# Patient Record
Sex: MALE | Race: WHITE | NOT HISPANIC OR LATINO | URBAN - METROPOLITAN AREA
[De-identification: names, ages, dates, MRNs, and addresses within clinical notes are randomized per-mention and may not be internally consistent; named-entity substitution may affect disease eponyms.]

---

## 2021-07-16 ENCOUNTER — OFFICE VISIT (OUTPATIENT)
Dept: URGENT CARE | Facility: CLINIC | Age: 46
End: 2021-07-16
Payer: COMMERCIAL

## 2021-07-16 VITALS
HEIGHT: 78 IN | RESPIRATION RATE: 16 BRPM | BODY MASS INDEX: 35.87 KG/M2 | SYSTOLIC BLOOD PRESSURE: 144 MMHG | DIASTOLIC BLOOD PRESSURE: 86 MMHG | WEIGHT: 310 LBS | OXYGEN SATURATION: 98 % | HEART RATE: 99 BPM | TEMPERATURE: 98 F

## 2021-07-16 DIAGNOSIS — Z11.9 ENCOUNTER FOR SCREENING EXAMINATION FOR INFECTIOUS DISEASE: ICD-10-CM

## 2021-07-16 DIAGNOSIS — B34.9 ACUTE VIRAL SYNDROME: Primary | ICD-10-CM

## 2021-07-16 DIAGNOSIS — Z20.822 COVID-19 VIRUS NOT DETECTED: ICD-10-CM

## 2021-07-16 LAB
CTP QC/QA: YES
SARS-COV-2 RDRP RESP QL NAA+PROBE: NEGATIVE

## 2021-07-16 PROCEDURE — 3008F PR BODY MASS INDEX (BMI) DOCUMENTED: ICD-10-PCS | Mod: CPTII,S$GLB,, | Performed by: NURSE PRACTITIONER

## 2021-07-16 PROCEDURE — 99204 PR OFFICE/OUTPT VISIT, NEW, LEVL IV, 45-59 MIN: ICD-10-PCS | Mod: S$GLB,,, | Performed by: NURSE PRACTITIONER

## 2021-07-16 PROCEDURE — U0002 COVID-19 LAB TEST NON-CDC: HCPCS | Mod: QW,S$GLB,, | Performed by: NURSE PRACTITIONER

## 2021-07-16 PROCEDURE — U0002: ICD-10-PCS | Mod: QW,S$GLB,, | Performed by: NURSE PRACTITIONER

## 2021-07-16 PROCEDURE — 3008F BODY MASS INDEX DOCD: CPT | Mod: CPTII,S$GLB,, | Performed by: NURSE PRACTITIONER

## 2021-07-16 PROCEDURE — 99204 OFFICE O/P NEW MOD 45 MIN: CPT | Mod: S$GLB,,, | Performed by: NURSE PRACTITIONER

## 2021-07-16 RX ORDER — FLUTICASONE PROPIONATE 50 MCG
1 SPRAY, SUSPENSION (ML) NASAL DAILY PRN
Qty: 16 G | Refills: 0 | Status: SHIPPED | OUTPATIENT
Start: 2021-07-16

## 2021-07-16 RX ORDER — BENZONATATE 200 MG/1
200 CAPSULE ORAL 3 TIMES DAILY PRN
Qty: 30 CAPSULE | Refills: 0 | Status: SHIPPED | OUTPATIENT
Start: 2021-07-16 | End: 2021-07-26

## 2022-01-05 ENCOUNTER — PATIENT MESSAGE (OUTPATIENT)
Dept: ADMINISTRATIVE | Facility: OTHER | Age: 47
End: 2022-01-05
Payer: COMMERCIAL

## 2022-01-05 ENCOUNTER — LAB VISIT (OUTPATIENT)
Dept: PRIMARY CARE CLINIC | Facility: CLINIC | Age: 47
End: 2022-01-05
Payer: COMMERCIAL

## 2022-01-05 DIAGNOSIS — Z20.822 CONTACT WITH AND (SUSPECTED) EXPOSURE TO COVID-19: ICD-10-CM

## 2022-01-05 LAB
CTP QC/QA: YES
SARS-COV-2 AG RESP QL IA.RAPID: POSITIVE

## 2022-01-05 PROCEDURE — 87811 SARS-COV-2 COVID19 W/OPTIC: CPT

## 2022-01-12 ENCOUNTER — LAB VISIT (OUTPATIENT)
Dept: PRIMARY CARE CLINIC | Facility: CLINIC | Age: 47
End: 2022-01-12
Payer: COMMERCIAL

## 2022-01-12 DIAGNOSIS — Z20.822 CONTACT WITH AND (SUSPECTED) EXPOSURE TO COVID-19: ICD-10-CM

## 2022-01-12 LAB
CTP QC/QA: YES
SARS-COV-2 AG RESP QL IA.RAPID: NEGATIVE

## 2022-01-12 PROCEDURE — 87811 SARS-COV-2 COVID19 W/OPTIC: CPT

## 2022-05-03 ENCOUNTER — PATIENT MESSAGE (OUTPATIENT)
Dept: RESEARCH | Facility: HOSPITAL | Age: 47
End: 2022-05-03
Payer: COMMERCIAL

## 2023-01-27 ENCOUNTER — PATIENT MESSAGE (OUTPATIENT)
Dept: RESEARCH | Facility: HOSPITAL | Age: 48
End: 2023-01-27
Payer: COMMERCIAL

## 2025-02-19 ENCOUNTER — OFFICE VISIT (OUTPATIENT)
Dept: INTERNAL MEDICINE | Facility: CLINIC | Age: 50
End: 2025-02-19
Payer: COMMERCIAL

## 2025-02-19 ENCOUNTER — LAB VISIT (OUTPATIENT)
Dept: LAB | Facility: HOSPITAL | Age: 50
End: 2025-02-19
Attending: INTERNAL MEDICINE
Payer: COMMERCIAL

## 2025-02-19 VITALS
HEIGHT: 78 IN | WEIGHT: 315 LBS | SYSTOLIC BLOOD PRESSURE: 128 MMHG | TEMPERATURE: 97 F | HEART RATE: 10 BPM | RESPIRATION RATE: 14 BRPM | OXYGEN SATURATION: 96 % | BODY MASS INDEX: 36.45 KG/M2 | DIASTOLIC BLOOD PRESSURE: 70 MMHG

## 2025-02-19 DIAGNOSIS — G47.00 INSOMNIA, UNSPECIFIED TYPE: ICD-10-CM

## 2025-02-19 DIAGNOSIS — M25.511 CHRONIC PAIN OF BOTH SHOULDERS: ICD-10-CM

## 2025-02-19 DIAGNOSIS — Z23 NEED FOR VACCINATION: ICD-10-CM

## 2025-02-19 DIAGNOSIS — M25.512 CHRONIC PAIN OF BOTH SHOULDERS: ICD-10-CM

## 2025-02-19 DIAGNOSIS — Z23 NEED FOR TETANUS BOOSTER: ICD-10-CM

## 2025-02-19 DIAGNOSIS — R68.82 DECREASED LIBIDO: ICD-10-CM

## 2025-02-19 DIAGNOSIS — Z12.12 ENCOUNTER FOR COLORECTAL CANCER SCREENING: ICD-10-CM

## 2025-02-19 DIAGNOSIS — G89.29 CHRONIC PAIN OF BOTH SHOULDERS: ICD-10-CM

## 2025-02-19 DIAGNOSIS — Z12.5 PROSTATE CANCER SCREENING: ICD-10-CM

## 2025-02-19 DIAGNOSIS — R53.83 FATIGUE, UNSPECIFIED TYPE: ICD-10-CM

## 2025-02-19 DIAGNOSIS — Z00.00 ANNUAL PHYSICAL EXAM: ICD-10-CM

## 2025-02-19 DIAGNOSIS — Z12.11 ENCOUNTER FOR COLORECTAL CANCER SCREENING: ICD-10-CM

## 2025-02-19 DIAGNOSIS — Z00.00 ANNUAL PHYSICAL EXAM: Primary | ICD-10-CM

## 2025-02-19 LAB
ALBUMIN SERPL BCP-MCNC: 3.6 G/DL (ref 3.5–5.2)
ALP SERPL-CCNC: 72 U/L (ref 40–150)
ALT SERPL W/O P-5'-P-CCNC: 21 U/L (ref 10–44)
ANION GAP SERPL CALC-SCNC: 14 MMOL/L (ref 8–16)
AST SERPL-CCNC: 40 U/L (ref 10–40)
BASOPHILS # BLD AUTO: 0.04 K/UL (ref 0–0.2)
BASOPHILS NFR BLD: 0.6 % (ref 0–1.9)
BILIRUB SERPL-MCNC: 0.4 MG/DL (ref 0.1–1)
BUN SERPL-MCNC: 13 MG/DL (ref 6–20)
CALCIUM SERPL-MCNC: 9.2 MG/DL (ref 8.7–10.5)
CHLORIDE SERPL-SCNC: 107 MMOL/L (ref 95–110)
CHOLEST SERPL-MCNC: 183 MG/DL (ref 120–199)
CHOLEST/HDLC SERPL: 4.1 {RATIO} (ref 2–5)
CO2 SERPL-SCNC: 19 MMOL/L (ref 23–29)
COMPLEXED PSA SERPL-MCNC: 1.6 NG/ML (ref 0–4)
CREAT SERPL-MCNC: 1 MG/DL (ref 0.5–1.4)
DIFFERENTIAL METHOD BLD: NORMAL
EOSINOPHIL # BLD AUTO: 0.2 K/UL (ref 0–0.5)
EOSINOPHIL NFR BLD: 2.9 % (ref 0–8)
ERYTHROCYTE [DISTWIDTH] IN BLOOD BY AUTOMATED COUNT: 13.1 % (ref 11.5–14.5)
EST. GFR  (NO RACE VARIABLE): >60 ML/MIN/1.73 M^2
ESTIMATED AVG GLUCOSE: 103 MG/DL (ref 68–131)
FSH SERPL-ACNC: 3.5 MIU/ML (ref 0.95–11.95)
GLUCOSE SERPL-MCNC: 90 MG/DL (ref 70–110)
HBA1C MFR BLD: 5.2 % (ref 4–5.6)
HCT VFR BLD AUTO: 44.5 % (ref 40–54)
HDLC SERPL-MCNC: 45 MG/DL (ref 40–75)
HDLC SERPL: 24.6 % (ref 20–50)
HGB BLD-MCNC: 14.8 G/DL (ref 14–18)
IMM GRANULOCYTES # BLD AUTO: 0.03 K/UL (ref 0–0.04)
IMM GRANULOCYTES NFR BLD AUTO: 0.5 % (ref 0–0.5)
LDLC SERPL CALC-MCNC: 120.2 MG/DL (ref 63–159)
LH SERPL-ACNC: 1 MIU/ML (ref 0.6–12.1)
LYMPHOCYTES # BLD AUTO: 1.5 K/UL (ref 1–4.8)
LYMPHOCYTES NFR BLD: 24 % (ref 18–48)
MCH RBC QN AUTO: 30.5 PG (ref 27–31)
MCHC RBC AUTO-ENTMCNC: 33.3 G/DL (ref 32–36)
MCV RBC AUTO: 92 FL (ref 82–98)
MONOCYTES # BLD AUTO: 0.5 K/UL (ref 0.3–1)
MONOCYTES NFR BLD: 7.8 % (ref 4–15)
NEUTROPHILS # BLD AUTO: 4 K/UL (ref 1.8–7.7)
NEUTROPHILS NFR BLD: 64.2 % (ref 38–73)
NONHDLC SERPL-MCNC: 138 MG/DL
NRBC BLD-RTO: 0 /100 WBC
PLATELET # BLD AUTO: 310 K/UL (ref 150–450)
PMV BLD AUTO: 10.4 FL (ref 9.2–12.9)
POTASSIUM SERPL-SCNC: 5 MMOL/L (ref 3.5–5.1)
PROT SERPL-MCNC: 6.6 G/DL (ref 6–8.4)
RBC # BLD AUTO: 4.86 M/UL (ref 4.6–6.2)
SODIUM SERPL-SCNC: 140 MMOL/L (ref 136–145)
TESTOST SERPL-MCNC: 242 NG/DL (ref 304–1227)
TRIGL SERPL-MCNC: 89 MG/DL (ref 30–150)
TSH SERPL DL<=0.005 MIU/L-ACNC: 1.69 UIU/ML (ref 0.4–4)
WBC # BLD AUTO: 6.29 K/UL (ref 3.9–12.7)

## 2025-02-19 PROCEDURE — 84403 ASSAY OF TOTAL TESTOSTERONE: CPT | Performed by: INTERNAL MEDICINE

## 2025-02-19 PROCEDURE — 85025 COMPLETE CBC W/AUTO DIFF WBC: CPT | Performed by: INTERNAL MEDICINE

## 2025-02-19 PROCEDURE — 80061 LIPID PANEL: CPT | Performed by: INTERNAL MEDICINE

## 2025-02-19 PROCEDURE — 84153 ASSAY OF PSA TOTAL: CPT | Performed by: INTERNAL MEDICINE

## 2025-02-19 PROCEDURE — 84443 ASSAY THYROID STIM HORMONE: CPT | Performed by: INTERNAL MEDICINE

## 2025-02-19 PROCEDURE — 83002 ASSAY OF GONADOTROPIN (LH): CPT | Performed by: INTERNAL MEDICINE

## 2025-02-19 PROCEDURE — 83001 ASSAY OF GONADOTROPIN (FSH): CPT | Performed by: INTERNAL MEDICINE

## 2025-02-19 PROCEDURE — 80053 COMPREHEN METABOLIC PANEL: CPT | Performed by: INTERNAL MEDICINE

## 2025-02-19 PROCEDURE — 83036 HEMOGLOBIN GLYCOSYLATED A1C: CPT | Performed by: INTERNAL MEDICINE

## 2025-02-19 RX ORDER — MELOXICAM 15 MG/1
15 TABLET ORAL DAILY PRN
Qty: 30 TABLET | Refills: 11 | Status: SHIPPED | OUTPATIENT
Start: 2025-02-19
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
## 2025-02-20 ENCOUNTER — RESULTS FOLLOW-UP (OUTPATIENT)
Dept: INTERNAL MEDICINE | Facility: CLINIC | Age: 50
End: 2025-02-20
Payer: COMMERCIAL

## 2025-02-20 DIAGNOSIS — R79.89 LOW TESTOSTERONE: Primary | ICD-10-CM

## 2025-02-20 NOTE — TELEPHONE ENCOUNTER
Your electrolytes, kidney/liver function, LH, FSH, prostate antigen are normal.  A1c indicates you are not diabetic.  Testosterone is low.  Let us plan to recheck this in about a week at 8:00 a.m..

## 2025-02-26 ENCOUNTER — CLINICAL SUPPORT (OUTPATIENT)
Dept: ENDOSCOPY | Facility: HOSPITAL | Age: 50
End: 2025-02-26
Attending: INTERNAL MEDICINE
Payer: COMMERCIAL

## 2025-02-26 ENCOUNTER — PATIENT MESSAGE (OUTPATIENT)
Dept: ENDOSCOPY | Facility: HOSPITAL | Age: 50
End: 2025-02-26

## 2025-02-26 VITALS — BODY MASS INDEX: 36.45 KG/M2 | WEIGHT: 315 LBS | HEIGHT: 78 IN

## 2025-02-26 DIAGNOSIS — Z12.11 ENCOUNTER FOR COLORECTAL CANCER SCREENING: ICD-10-CM

## 2025-02-26 DIAGNOSIS — Z12.11 COLON CANCER SCREENING: Primary | ICD-10-CM

## 2025-02-26 DIAGNOSIS — Z12.12 ENCOUNTER FOR COLORECTAL CANCER SCREENING: ICD-10-CM

## 2025-02-26 NOTE — PLAN OF CARE
Referral for procedure from PAT appointment      Spoke to patient to schedule procedure(s) Colonoscopy       Physician to perform procedure(s) Dr. Gordon  Date of Procedure (s) 03/18/2025  Arrival Time 1:40 Pm   Time of Procedure(s) 2:40 PM    Location of Procedure(s) [facility] [address] [facility]  Type of Rx Prep sent to patient: PEG  Instructions provided to patient via [facility]    Patient was informed on the following information and verbalized understanding. Screening questionnaire reviewed with patient and complete. If procedure requires anesthesia, a responsible adult needs to be present to accompany the patient home, patient cannot drive after receiving anesthesia. Appointment details are tentative, especially check-in time. Patient will receive a prep-op call 7 days prior to confirm check-in time for procedure. If applicable the patient should contact their pharmacy to verify Rx for procedure prep is ready for pick-up. Patient was advised to call the scheduling department at [phone] if pharmacy states no Rx is available. Patient was advised to call the endoscopy scheduling department if any questions or concerns arise.      [facility] Scheduling Department

## 2025-02-27 ENCOUNTER — LAB VISIT (OUTPATIENT)
Dept: LAB | Facility: HOSPITAL | Age: 50
End: 2025-02-27
Payer: COMMERCIAL

## 2025-02-27 DIAGNOSIS — R79.89 LOW TESTOSTERONE: ICD-10-CM

## 2025-02-27 LAB — TESTOST SERPL-MCNC: 276 NG/DL (ref 304–1227)

## 2025-02-27 PROCEDURE — 36415 COLL VENOUS BLD VENIPUNCTURE: CPT | Mod: PO | Performed by: INTERNAL MEDICINE

## 2025-02-27 PROCEDURE — 84403 ASSAY OF TOTAL TESTOSTERONE: CPT | Performed by: INTERNAL MEDICINE

## 2025-02-27 RX ORDER — POLYETHYLENE GLYCOL 3350, SODIUM SULFATE ANHYDROUS, SODIUM BICARBONATE, SODIUM CHLORIDE, POTASSIUM CHLORIDE 236; 22.74; 6.74; 5.86; 2.97 G/4L; G/4L; G/4L; G/4L; G/4L
4 POWDER, FOR SOLUTION ORAL ONCE
Qty: 4000 ML | Refills: 0 | Status: SHIPPED | OUTPATIENT
Start: 2025-02-27 | End: 2025-02-27

## 2025-02-28 ENCOUNTER — PATIENT MESSAGE (OUTPATIENT)
Dept: INTERNAL MEDICINE | Facility: CLINIC | Age: 50
End: 2025-02-28
Payer: COMMERCIAL

## 2025-02-28 ENCOUNTER — RESULTS FOLLOW-UP (OUTPATIENT)
Dept: INTERNAL MEDICINE | Facility: CLINIC | Age: 50
End: 2025-02-28
Payer: COMMERCIAL

## 2025-02-28 DIAGNOSIS — R79.89 LOW TESTOSTERONE: Primary | ICD-10-CM

## 2025-02-28 RX ORDER — TESTOSTERONE 20.25 MG/1.25G
20.25 GEL TOPICAL DAILY
Qty: 75 G | Refills: 0 | Status: SHIPPED | OUTPATIENT
Start: 2025-02-28

## 2025-02-28 NOTE — TELEPHONE ENCOUNTER
----- Message from Mario Vázquez MD sent at 2/28/2025  9:43 AM CST -----      ----- Message -----  From: Ayah Varghese MA  Sent: 2/28/2025   9:40 AM CST  To: Mario Vázquez MD    ----- Message from Ayah Varghese MA sent at 2/28/2025  9:40 AM CST -----      ----- Message -----  From: Yung, Pro[facility] Interface  Sent: 2/27/2025   1:27 PM CST  To: Mario Vázquez MD

## 2025-02-28 NOTE — TELEPHONE ENCOUNTER
Testosterone ordered.  Please have patient follow-up with me in a month as early in the day as possible.  We need to try to get labs around 8:00 a.m.

## 2025-03-10 ENCOUNTER — PATIENT MESSAGE (OUTPATIENT)
Dept: ENDOSCOPY | Facility: HOSPITAL | Age: 50
End: 2025-03-10
Payer: COMMERCIAL

## 2025-03-18 ENCOUNTER — ANESTHESIA EVENT (OUTPATIENT)
Dept: ENDOSCOPY | Facility: HOSPITAL | Age: 50
End: 2025-03-18
Payer: COMMERCIAL

## 2025-03-18 ENCOUNTER — ANESTHESIA (OUTPATIENT)
Dept: ENDOSCOPY | Facility: HOSPITAL | Age: 50
End: 2025-03-18
Payer: COMMERCIAL

## 2025-03-18 ENCOUNTER — HOSPITAL ENCOUNTER (OUTPATIENT)
Facility: HOSPITAL | Age: 50
Discharge: HOME | End: 2025-03-18
Attending: INTERNAL MEDICINE | Admitting: INTERNAL MEDICINE
Payer: COMMERCIAL

## 2025-03-18 VITALS
SYSTOLIC BLOOD PRESSURE: 97 MMHG | HEIGHT: 78 IN | TEMPERATURE: 98 F | HEART RATE: 75 BPM | OXYGEN SATURATION: 95 % | WEIGHT: 315 LBS | BODY MASS INDEX: 36.45 KG/M2 | DIASTOLIC BLOOD PRESSURE: 55 MMHG | RESPIRATION RATE: 18 BRPM

## 2025-03-18 DIAGNOSIS — Z12.12 ENCOUNTER FOR COLORECTAL CANCER SCREENING: Primary | ICD-10-CM

## 2025-03-18 DIAGNOSIS — Z12.11 SCREENING FOR COLON CANCER: ICD-10-CM

## 2025-03-18 DIAGNOSIS — Z12.11 ENCOUNTER FOR COLORECTAL CANCER SCREENING: Primary | ICD-10-CM

## 2025-03-18 PROCEDURE — 63600175 PHARM REV CODE 636 W HCPCS: Performed by: NURSE ANESTHETIST, CERTIFIED REGISTERED

## 2025-03-18 PROCEDURE — G0121 COLON CA SCRN NOT HI RSK IND: HCPCS | Performed by: INTERNAL MEDICINE

## 2025-03-18 PROCEDURE — 37000009 HC ANESTHESIA EA ADD 15 MINS: Performed by: INTERNAL MEDICINE

## 2025-03-18 PROCEDURE — 25000003 PHARM REV CODE 250: Performed by: NURSE ANESTHETIST, CERTIFIED REGISTERED

## 2025-03-18 PROCEDURE — 37000008 HC ANESTHESIA 1ST 15 MINUTES: Performed by: INTERNAL MEDICINE

## 2025-03-18 PROCEDURE — 25000003 PHARM REV CODE 250: Performed by: INTERNAL MEDICINE

## 2025-03-18 PROCEDURE — G0121 COLON CA SCRN NOT HI RSK IND: HCPCS | Mod: ,,, | Performed by: INTERNAL MEDICINE

## 2025-03-18 RX ORDER — SODIUM CHLORIDE 9 MG/ML
INJECTION, SOLUTION INTRAVENOUS CONTINUOUS
Status: DISCONTINUED | OUTPATIENT
Start: 2025-03-18 | End: 2025-03-18 | Stop reason: HOSPADM

## 2025-03-18 RX ORDER — PROPOFOL 10 MG/ML
VIAL (ML) INTRAVENOUS CONTINUOUS PRN
Status: DISCONTINUED | OUTPATIENT
Start: 2025-03-18 | End: 2025-03-18

## 2025-03-18 RX ORDER — LIDOCAINE HYDROCHLORIDE 20 MG/ML
INJECTION INTRAVENOUS
Status: DISCONTINUED | OUTPATIENT
Start: 2025-03-18 | End: 2025-03-18

## 2025-03-18 RX ORDER — DEXMEDETOMIDINE HYDROCHLORIDE 100 UG/ML
INJECTION, SOLUTION INTRAVENOUS
Status: DISCONTINUED | OUTPATIENT
Start: 2025-03-18 | End: 2025-03-18

## 2025-03-18 RX ADMIN — SODIUM CHLORIDE: 0.9 INJECTION, SOLUTION INTRAVENOUS at 02:03

## 2025-03-18 RX ADMIN — DEXMEDETOMIDINE 8 MCG: 100 INJECTION, SOLUTION, CONCENTRATE INTRAVENOUS at 02:03

## 2025-03-18 RX ADMIN — DEXMEDETOMIDINE 4 MCG: 100 INJECTION, SOLUTION, CONCENTRATE INTRAVENOUS at 02:03

## 2025-03-18 RX ADMIN — PROPOFOL 150 MCG/KG/MIN: 10 INJECTION, EMULSION INTRAVENOUS at 02:03

## 2025-03-18 RX ADMIN — LIDOCAINE HYDROCHLORIDE 50 MG: 20 INJECTION INTRAVENOUS at 02:03

## 2025-03-18 RX ADMIN — DEXMEDETOMIDINE 4 MCG: 100 INJECTION, SOLUTION, CONCENTRATE INTRAVENOUS at 03:03

## 2025-03-18 NOTE — TRANSFER OF CARE
"Anesthesia Transfer of Care Note    Patient: Edinson Rodriguez    Procedure(s) Performed: Procedure(s) (LRB):  COLONOSCOPY, SCREENING, LOW RISK PATIENT (N/A)    Patient location: PACU    Anesthesia Type: general    Transport from OR: Transported from OR on 6-10 L/min O2 by face mask with adequate spontaneous ventilation    Post pain: adequate analgesia    Post assessment: no apparent anesthetic complications    Post vital signs: stable    Level of consciousness: awake    Nausea/Vomiting: no nausea/vomiting    Complications: none    Transfer of care protocol was followed    Last vitals: Visit Vitals  /84 (BP Location: Left arm, Patient Position: Lying)   Pulse 82   Temp 36.8 °C (98.2 °F) (Temporal)   Resp 18   Ht 6' 6" (1.981 m)   Wt (!) 142.9 kg (315 lb 0.6 oz)   SpO2 (!) 94%   BMI 36.41 kg/m²     "

## 2025-03-18 NOTE — ANESTHESIA PREPROCEDURE EVALUATION
"                 Pre-operative evaluation for Procedure(s) (LRB):  COLONOSCOPY, SCREENING, LOW RISK PATIENT (N/A)    Edinson Rodriguez is a 49 y.o. male w/ obesity (BMI 37) who presents for screening colonoscopy.       Prev airway: none on record      2D Echo: none on record      EKG: none on record        Problem List[1]    Review of patient's allergies indicates:  No Known Allergies    Past Surgical History:   Procedure Laterality Date    SPINE SURGERY  1989    C4 & C5 fracture surgery when 13 years old         Vital Signs:         CBC: No results for input(s): "WBC", "RBC", "HGB", "HCT", "PLT", "MCV", "MCH", "MCHC" in the last 72 hours.    CMP: No results for input(s): "NA", "K", "CL", "CO2", "BUN", "CREATININE", "GLU", "MG", "PHOS", "CALCIUM", "ALBUMIN", "PROT", "ALKPHOS", "ALT", "AST", "BILITOT" in the last 72 hours.    INR  No results for input(s): "PT", "INR", "PROTIME", "APTT" in the last 72 hours.        Pre-op Assessment    I have reviewed the Patient Summary Reports.     I have reviewed the Nursing Notes. I have reviewed the NPO Status.   I have reviewed the Medications.     Review of Systems  Anesthesia Hx:  No problems with previous Anesthesia             Denies Family Hx of Anesthesia complications.     Hematology/Oncology:    Oncology Normal                                   Cardiovascular:  Cardiovascular Normal      Denies Valvular problems/Murmurs.                                         Pulmonary:  Pulmonary Normal    Denies Asthma.                    Renal/:  Renal/ Normal                 Hepatic/GI:  Hepatic/GI Normal Bowel Prep.                   Neurological:  Neurology Normal      Denies Seizures.                                Endocrine:  Endocrine Normal          Obesity / BMI > 30      Physical Exam  General: Alert and Oriented    Airway:  Mallampati: II   Mouth Opening: Normal  TM Distance: Normal  Tongue: Normal    Dental:  Intact    Chest/Lungs:  Clear to auscultation, Normal Respiratory " Rate    Heart:  Rate: Normal  Rhythm: Regular Rhythm        Anesthesia Plan  Type of Anesthesia, risks & benefits discussed:    Anesthesia Type: Gen Natural Airway  Intra-op Monitoring Plan: Standard ASA Monitors  Post Op Pain Control Plan:   (medical reason for not using multimodal pain management)  Induction:  IV  Informed Consent: Informed consent signed with the Patient and all parties understand the risks and agree with anesthesia plan.  All questions answered.   ASA Score: 2  Day of Surgery Review of History & Physical: H&P Update referred to the surgeon/provider.    Ready For Surgery From Anesthesia Perspective.     .           [1] There is no problem list on file for this patient.

## 2025-03-18 NOTE — PROVATION PATIENT INSTRUCTIONS
Discharge Summary/Instructions after an Endoscopic Procedure  Patient Name: Edinson Rodriguez  Patient MRN: [id number]  Patient YOB: 1975  Tuesday, March 18, 2025  Bola Clayton MD  Dear patient,  As a result of recent federal legislation (The Federal Cures Act), you may   receive lab or pathology results from your procedure in your [facility]   account before your physician is able to contact you. Your physician or   their representative will relay the results to you with their   recommendations at their soonest availability.  Thank you,  RESTRICTIONS:  During your procedure today, you received medications for sedation.  These   medications may affect your judgment, balance and coordination.  Therefore,   for 24 hours, you have the following restrictions:   - DO NOT drive a car, operate machinery, make legal/financial decisions,   sign important papers or drink alcohol.    ACTIVITY:  Today: no heavy lifting, straining or running due to procedural   sedation/anesthesia.  The following day: return to full activity including work.  DIET:  Eat and drink normally unless instructed otherwise.     TREATMENT FOR COMMON SIDE EFFECTS:  - Mild abdominal pain, nausea, belching, bloating or excessive gas:  rest,   eat lightly and use a heating pad.  - Sore Throat: treat with throat lozenges and/or gargle with warm salt   water.  - Because air was used during the procedure, expelling large amounts of air   from your rectum or belching is normal.  - If a bowel prep was taken, you may not have a bowel movement for 1-3 days.    This is normal.  SYMPTOMS TO WATCH FOR AND REPORT TO YOUR PHYSICIAN:  1. Abdominal pain or bloating, other than gas cramps.  2. Chest pain.  3. Back pain.  4. Signs of infection such as: chills or fever occurring within 24 hours   after the procedure.  5. Rectal bleeding, which would show as bright red, maroon, or black stools.   (A tablespoon of blood from the rectum is not serious, especially  if   hemorrhoids are present.)  6. Vomiting.  7. Weakness or dizziness.  GO DIRECTLY TO THE NEAREST EMERGENCY ROOM IF YOU HAVE ANY OF THE FOLLOWING:      Difficulty breathing              Chills and/or fever over 101 F   Persistent vomiting and/or vomiting blood   Severe abdominal pain   Severe chest pain   Black, tarry stools   Bleeding- more than one tablespoon   Any other symptom or condition that you feel may need urgent attention  Your doctor recommends these additional instructions:  If any biopsies were taken, your doctors clinic will contact you in 1 to 2   weeks with any results.  - Discharge patient to home (ambulatory).   - Patient has a contact number available for emergencies.  The signs and   symptoms of potential delayed complications were discussed with the   patient.  Return to normal activities tomorrow.  Written discharge   instructions were provided to the patient.   - Resume previous diet.   - Continue present medications.   - Return to primary care physician as previously scheduled.   - Repeat colonoscopy in 10 years for screening purposes.  For questions, problems or results please call your physician - Bola Clayton MD at Work:  [phone].  [facility], EMERGENCY ROOM PHONE NUMBER: [phone]  IF A COMPLICATION OR EMERGENCY SITUATION ARISES AND YOU ARE UNABLE TO REACH   YOUR PHYSICIAN - GO DIRECTLY TO THE EMERGENCY ROOM.  MD Bola Dutton MD  3/18/2025 3:13:28 PM  This report has been verified and signed electronically.  Dear patient,  As a result of recent federal legislation (The Federal Cures Act), you may   receive lab or pathology results from your procedure in your [facility]   account before your physician is able to contact you. Your physician or   their representative will relay the results to you with their   recommendations at their soonest availability.  Thank you,  PROVATION

## 2025-03-18 NOTE — H&P
Short Stay Endoscopy History and Physical    PCP - Mario Vázquez MD  Referring Physician - Mario Vázquez MD  [address] [facility]  [place] [id number]    Procedure - Colonoscopy  ASA - per anesthesia  Mallampati - per anesthesia  History of Anesthesia problems - no  Family history Anesthesia problems -  no   Plan of anesthesia - General    HPI  49 y.o. male  Reason for procedure:   Encounter for colorectal cancer screening [Z12.11, Z12.12]        ROS:  Constitutional: No fevers, chills, No weight loss  CV: No chest pain  Pulm: No cough, No shortness of breath  GI: see HPI    Medical History:  has no past medical history on file.    Surgical History:  has a past surgical history that includes Spine surgery (1989).    Family History: family history includes Alcohol abuse in his father; Cancer in his mother..    Social History:  reports that he has never smoked. He does not have any smokeless tobacco history on file. He reports current alcohol use of about 2.0 standard drinks of alcohol per week. He reports that he does not use drugs.    Review of patient's allergies indicates:  No Known Allergies    Medications:   Prescriptions Prior to Admission[1]    Physical Exam:    Vital Signs:   Vitals:    03/18/25 1406   BP: 122/84   Pulse: 82   Resp: 18   Temp: 98.2 °F (36.8 °C)       General Appearance: Well appearing in no acute distress  Abdomen: Soft, non tender, non distended with normal bowel sounds, no masses    Labs:  Lab Results   Component Value Date    WBC 6.29 02/19/2025    HGB 14.8 02/19/2025    HCT 44.5 02/19/2025     02/19/2025    CHOL 183 02/19/2025    TRIG 89 02/19/2025    HDL 45 02/19/2025    ALT 21 02/19/2025    AST 40 02/19/2025     02/19/2025    K 5.0 02/19/2025     02/19/2025    CREATININE 1.0 02/19/2025    BUN 13 02/19/2025    CO2 19 (L) 02/19/2025    TSH 1.689 02/19/2025    PSA 1.6 02/19/2025    HGBA1C 5.2 02/19/2025       I have explained the risks and benefits of this  endoscopic procedure to the patient including but not limited to bleeding, inflammation, infection, perforation, and death.      Bola Clayton MD       [1]   Medications Prior to Admission   Medication Sig Dispense Refill Last Dose/Taking    meloxicam (MOBIC) 15 MG tablet Take 1 tablet (15 mg total) by mouth daily as needed for Pain. 30 tablet 11 Past Week    testosterone (ANDROGEL) 20.25 mg/1.25 gram (1.62 %) GlPm Place 20.25 mg onto the skin once daily. 75 g 0 3/16/2025

## 2025-03-19 NOTE — ANESTHESIA POSTPROCEDURE EVALUATION
Anesthesia Post Evaluation    Patient: Edinson Rodriguez    Procedure(s) Performed: Procedure(s) (LRB):  COLONOSCOPY, SCREENING, LOW RISK PATIENT (N/A)    Final Anesthesia Type: general      Patient location during evaluation: GI PACU  Patient participation: Yes- Able to Participate  Level of consciousness: awake  Post-procedure vital signs: reviewed and stable  Pain management: adequate  Airway patency: patent    PONV status at discharge: No PONV  Anesthetic complications: no      Cardiovascular status: blood pressure returned to baseline  Respiratory status: unassisted, spontaneous ventilation and room air                Vitals Value Taken Time   BP 97/55 03/18/25 15:45   Temp 36.5 °C (97.7 °F) 03/18/25 15:19   Pulse 75 03/18/25 15:45   Resp 18 03/18/25 15:45   SpO2 95 % 03/18/25 15:45         Event Time   Out of Recovery 16:01:23         Pain/Hamida Score: Hamida Score: 10 (3/18/2025  3:20 PM)

## 2025-03-21 ENCOUNTER — CLINICAL SUPPORT (OUTPATIENT)
Dept: REHABILITATION | Facility: HOSPITAL | Age: 50
End: 2025-03-21
Attending: INTERNAL MEDICINE
Payer: COMMERCIAL

## 2025-03-21 DIAGNOSIS — M25.511 CHRONIC PAIN OF BOTH SHOULDERS: ICD-10-CM

## 2025-03-21 DIAGNOSIS — G89.29 CHRONIC PAIN OF BOTH SHOULDERS: ICD-10-CM

## 2025-03-21 DIAGNOSIS — M25.512 CHRONIC PAIN OF BOTH SHOULDERS: ICD-10-CM

## 2025-03-21 PROCEDURE — 97110 THERAPEUTIC EXERCISES: CPT | Mod: PO | Performed by: PHYSICAL THERAPIST

## 2025-03-21 PROCEDURE — 97161 PT EVAL LOW COMPLEX 20 MIN: CPT | Mod: PO | Performed by: PHYSICAL THERAPIST

## 2025-03-21 NOTE — PROGRESS NOTES
Outpatient Rehab    Physical Therapy Evaluation    Patient Name: Igor Rodriguez  MRN: [id number]  YOB: 1975  Encounter Date: 3/21/2025    Therapy Diagnosis:   Encounter Diagnosis   Name Primary?    Chronic pain of both shoulders      Physician: Mario Vázquez MD    Physician Orders: Eval and Treat  Medical Diagnosis: Chronic pain of both shoulders    Visit # / Visits Authorized:  1 / 1  Date of Evaluation: 3/21/2025  Insurance Authorization Period: 2/19/2025 to 12/31/2025  Plan of Care Certification:  3/21/2025 to 6/21/2025      PT/PTA:     Number of PTA visits since last PT visit:   Time In: 0705   Time Out: 0750  Total Time: 45   Total Billable Time: 45    Intake Outcome Measure for FOTO Survey    Therapist reviewed FOTO scores for Igor Rodriguez on 3/21/2025.   FOTO report - see Media section or FOTO account episode details.     Intake Score:  %         Subjective   History of Present Illness  Igor is a 49 y.o. male who reports to physical therapy with a chief concern of bilateral shoulder pain.         Diagnostic tests related to this condition: None.        Dominant Hand: Right  History of Present Condition/Illness: End of last year, he started to feel some challenges lifting the arms overhead and when sleeping on the sides (both). Around Mardi gras, he ws doing some yardwork and felt an increase in right side shoulder pain. He had a bad AC dislocation in his 20s that has pain from time to time but has no been consistent. Lifting > 25 med pounds will cause pain in the arms. He does not endorse N&T or dropping of items at this time. He had a C3-5 fusion in his teenage years so he has limited range of motion.    Activities of Daily Living  Social history was obtained from Patient.                   Pain     Patient reports a current pain level of 2/10. Pain at best is reported as 0/10. Pain at worst is reported as 5/10.   Location: R>L shoulders - deep in the shoulder  Clinical Progression (since onset):  Stable  Pain Qualities: Other (Comment)  Other Pain Qualities: constant  Pain-Relieving Factors: Rest, Other (Comment)  Other Pain-Relieving Factors: nothing  Pain-Aggravating Factors: Lifting, Holding objects         Employment  Patient does not report that: Does the patient's condition impact their ability to work?  Employment Status: Employed full-time          Past Medical History/Physical Systems Review:   Edinson Rodriguez  has no past medical history on file.    Edinson Rodriguez  has a past surgical history that includes Spine surgery (1989) and colonoscopy, screening, low risk patient (N/A, 3/18/2025).    Edinson has a current medication list which includes the following prescription(s): meloxicam and testosterone.    Review of patient's allergies indicates:  No Known Allergies     Objective      Shoulder Palpation  Right Shoulder Palpation  Abnormal: Muscle and Tendon/Ligament  Right Shoulder Muscle Palpation Observations: mod upper trap, levator scap and pectoral tightness  Right Shoulder Tendon/Ligament Palpation Observations: tenderness over ACJ       Left Shoulder Palpation  Abnormal: Muscle  Left Shoulder Muscle Palpation Observations: mod upper trap, levator scap and pectoral tightness           Subcranial Range of Motion   Active Restricted? Passive Restricted? Pain   Flexion         Protraction         Retraction           Cervical Range of Motion   Active (deg) Passive (deg) Pain   Flexion 42       Extension 22       Right Lateral Flexion         Right Rotation 40       Left Lateral Flexion         Left Rotation 35              Shoulder Range of Motion  Right Shoulder   Active (deg) Passive (deg) Pain   Flexion 135       Extension         Scaption         ABduction 140       ADduction         Horizontal ABduction         Horizontal ADduction         External Rotation (Shoulder ABducted 0 degrees)         External Rotation (Shoulder ABducted 45 degrees)         External Rotation (Shoulder ABducted 90  degrees)         Internal Rotation (Shoulder ABducted 0 degrees)         Internal Rotation (Shoulder ABducted 45 degrees)         Internal Rotation (Shoulder ABducted 90 degrees)           Left Shoulder   Active (deg) Passive (deg) Pain   Flexion 155       Extension         Scaption         ABduction 155       ADduction         Horizontal ABduction         Horizontal ADduction         External Rotation (Shoulder ABducted 0 degrees)         External Rotation (Shoulder ABducted 45 degrees)         External Rotation (Shoulder ABducted 90 degrees)         Internal Rotation (Shoulder ABducted 0 degrees)         Internal Rotation (Shoulder ABducted 45 degrees)         Internal Rotation (Shoulder ABducted 90 degrees)             Shoulder, Elbow, or Forearm Range of Motion Details: Left functional internal rotation to T10 with pain, external rotation to T4 with no pain, right functional internal rotation to L4 with ant translation, external rotation to T3         Shoulder Strength - Planes of Motion   Right Strength Right Pain Left Strength Left  Pain   Flexion 4+   4+     Extension           ABduction 4+   4+     ADduction           Horizontal ABduction           Horizontal ADduction           Internal Rotation 0°           Internal Rotation 90° 4+   4+     External Rotation 0°           External Rotation 90° 4   4+                   Shoulder Special Tests  Positive: Right Ely's and Left Ely's  Impingement Tests  Positive: Right Quintanilla-Bradly, Left Quintanilla-Bradly, Right Rodney's, and Left Rodney's           Shoulder Joint Mobility  Right Shoulder Mobility  Hypomobile: Posterior Capsule Mobility, Inferior Capsule Mobility, and Acromioclavicular  Left Shoulder Mobility  Hypomobile: Posterior Capsule Mobility, Inferior Capsule Mobility, and Acromioclavicular       Right Scapular Mobility  Hypomobile: Medial  Left Scapular Mobility  Hypomobile: Medial                   Treatment:  Therapeutic Exercise  TE 1: bilateral  ER RTB  TE 2: horizontal abd RTB      Time Entry(in minutes):  PT Evaluation (Low) Time Entry: 25  Therapeutic Exercise Time Entry: 15    Assessment & Plan   Assessment  Igor presents with a condition of Low complexity.   Presentation of Symptoms: Stable  Will Comorbidities Impact Care: No       Functional Limitations: Activity tolerance, Completing self-care activities, Functional mobility, Disrupted sleep pattern, Pain with ADLs/IADLs, Pain when reaching, Performing household chores, Range of motion, Reaching  Impairments: Abnormal muscle firing, Abnormal or restricted range of motion, Impaired physical strength, Lack of appropriate home exercise program, Pain with functional activity    Patient Goal for Therapy (PT): find otu what is wrong and prevent further issues with the shoulder  Prognosis: Good  Assessment Details: Patient presents to therapy with signs and symptoms consistent with R>L side shoulder impingement syndrome caused by over use of the rotator cuff complex with recent yardwork flaring the shoulders up. He has tightness in the inferior and posterior joint capsule putting more stressover the anterior shoulder complex. He responded well to RTC muscle engagement activity and scapular strengthening activity.     Plan  From a physical therapy perspective, the patient would benefit from: Skilled Rehab Services    Planned therapy interventions include: Therapeutic exercise, Therapeutic activities, Neuromuscular re-education, Manual therapy, ADLs/IADLs, and Other (Comment). Dry needling if indicated          Visit Frequency: 1 times Per Week for 6 Weeks.       This plan was discussed with Patient.   Discussion participants: Agreed Upon Plan of Care             Patient's spiritual, cultural, and educational needs considered and patient agreeable to plan of care and goals.           Goals:   Active       LTG       50% FOTO score improvement       Start:  03/21/25    Expected End:  05/16/25            Pt will  be able to perform > 60 min yardwork without pain       Start:  03/21/25    Expected End:  05/16/25            Pt will be able to don/doff shirts and jackets without pain       Start:  03/21/25    Expected End:  05/16/25               STG       10 degree R shoulder ROM improvement       Start:  03/21/25    Expected End:  04/18/25            1/2 grade MMT improvement       Start:  03/21/25    Expected End:  04/18/25            50% reduction in tenderness over the shoulder musculature       Start:  03/21/25    Expected End:  04/18/25            50% improvement in glenohumeral mobility in the shoulders       Start:  03/21/25    Expected End:  04/18/25                Mitzi Kemp PT, DPT

## 2025-03-24 ENCOUNTER — PATIENT MESSAGE (OUTPATIENT)
Dept: INTERNAL MEDICINE | Facility: CLINIC | Age: 50
End: 2025-03-24
Payer: COMMERCIAL

## 2025-03-24 NOTE — PROGRESS NOTES
Outpatient Rehab    Physical Therapy Visit    Patient Name: Igor Rodriguez  MRN: [id number]  YOB: 1975  Encounter Date: 3/25/2025    Therapy Diagnosis:   Encounter Diagnosis   Name Primary?    Chronic pain of both shoulders Yes     Physician: Mario Vázquez MD    Physician Orders: Eval and Treat  Medical Diagnosis: Chronic pain of both shoulders    Visit # / Visits Authorized:  1 / 10  Insurance Authorization Period: 2/26/2025 to 12/31/2025  Date of Evaluation: 3/21/2025  Plan of Care Certification:  3/21/2025 to 6/21/2025      PT/PTA: PTA   Number of PTA visits since last PT visit:1  Time In: 0702   Time Out: 0801  Total Time: 59   Total Billable Time: 59    FOTO:  Intake Score:  %  Survey Score 1:  %  Survey Score 2:  %         Subjective   he's a little sore today from activity over the weekend.  Pain reported as 2/10. bilateral shoulder R>L    Objective            Treatment:  Therapeutic Exercise  TE 1: dowel flexion with 3# dowel 3x10  TE 2: side lying ER, abduction, flexion 2x10 each  Manual Therapy  MT 1: inferior/posterior joint mobilization  Balance/Neuromuscular Re-Education  NMR 1: bilateral ER RTB 3x10  NMR 2: horizontal abduction RTB 3x10  NMR 3: supine serratus punches with 3# dowel 3x10  NMR 4: rows GTB 3x10  NMR 5: shoulder extension GTB 3x10    Time Entry(in minutes):  Manual Therapy Time Entry: 15  Neuromuscular Re-Education Time Entry: 24  Therapeutic Exercise Time Entry: 20    Assessment & Plan   Assessment: Igor presents to treatment with bilateral shoulder pain. He had a favorable response to manual therapy interventions prior to the start of session. Initiated scapular stability and rotator cuff strengthening to improve function and decrease pain. Will continue to progress as tolerated.  Evaluation/Treatment Tolerance: Patient tolerated treatment well    Patient will continue to benefit from skilled outpatient physical therapy to address the deficits listed in the problem list box on  initial evaluation, provide pt/family education and to maximize pt's level of independence in the home and community environment.     Patient's spiritual, cultural, and educational needs considered and patient agreeable to plan of care and goals.           Plan: continue to progress per PT POC    Goals:   Active       LTG       50% FOTO score improvement (Progressing)       Start:  03/21/25    Expected End:  05/16/25            Pt will be able to perform > 60 min yardwork without pain (Progressing)       Start:  03/21/25    Expected End:  05/16/25            Pt will be able to don/doff shirts and jackets without pain (Progressing)       Start:  03/21/25    Expected End:  05/16/25               STG       10 degree R shoulder ROM improvement (Progressing)       Start:  03/21/25    Expected End:  04/18/25            1/2 grade MMT improvement (Progressing)       Start:  03/21/25    Expected End:  04/18/25            50% reduction in tenderness over the shoulder musculature (Progressing)       Start:  03/21/25    Expected End:  04/18/25            50% improvement in glenohumeral mobility in the shoulders (Progressing)       Start:  03/21/25    Expected End:  04/18/25                Janis Watters PTA

## 2025-03-25 ENCOUNTER — CLINICAL SUPPORT (OUTPATIENT)
Dept: REHABILITATION | Facility: HOSPITAL | Age: 50
End: 2025-03-25
Payer: COMMERCIAL

## 2025-03-25 DIAGNOSIS — M25.511 CHRONIC PAIN OF BOTH SHOULDERS: Primary | ICD-10-CM

## 2025-03-25 DIAGNOSIS — G89.29 CHRONIC PAIN OF BOTH SHOULDERS: Primary | ICD-10-CM

## 2025-03-25 DIAGNOSIS — M25.512 CHRONIC PAIN OF BOTH SHOULDERS: Primary | ICD-10-CM

## 2025-03-25 PROCEDURE — 97110 THERAPEUTIC EXERCISES: CPT | Mod: PO,CQ

## 2025-03-25 PROCEDURE — 97140 MANUAL THERAPY 1/> REGIONS: CPT | Mod: PO,CQ

## 2025-03-25 PROCEDURE — 97112 NEUROMUSCULAR REEDUCATION: CPT | Mod: PO,CQ

## 2025-04-01 ENCOUNTER — CLINICAL SUPPORT (OUTPATIENT)
Dept: REHABILITATION | Facility: HOSPITAL | Age: 50
End: 2025-04-01
Payer: COMMERCIAL

## 2025-04-01 DIAGNOSIS — G89.29 CHRONIC PAIN OF BOTH SHOULDERS: Primary | ICD-10-CM

## 2025-04-01 DIAGNOSIS — M25.512 CHRONIC PAIN OF BOTH SHOULDERS: Primary | ICD-10-CM

## 2025-04-01 DIAGNOSIS — M25.511 CHRONIC PAIN OF BOTH SHOULDERS: Primary | ICD-10-CM

## 2025-04-01 PROCEDURE — 97110 THERAPEUTIC EXERCISES: CPT | Mod: PO | Performed by: PHYSICAL THERAPIST

## 2025-04-01 PROCEDURE — 97112 NEUROMUSCULAR REEDUCATION: CPT | Mod: PO | Performed by: PHYSICAL THERAPIST

## 2025-04-01 PROCEDURE — 97140 MANUAL THERAPY 1/> REGIONS: CPT | Mod: PO | Performed by: PHYSICAL THERAPIST

## 2025-04-01 NOTE — PROGRESS NOTES
Outpatient Rehab    Physical Therapy Visit    Patient Name: Igor Rodriguez  MRN: [id number]  YOB: 1975  Encounter Date: 4/1/2025    Therapy Diagnosis:   Encounter Diagnosis   Name Primary?    Chronic pain of both shoulders Yes     Physician: Mario Vázquez MD    Physician Orders: Eval and Treat  Medical Diagnosis: Chronic pain of both shoulders    Visit # / Visits Authorized:  2 / 10  Insurance Authorization Period: 2/26/2025 to 12/31/2025  Date of Evaluation: 3/21/2025  Plan of Care Certification: 3/21/2025 to 6/21/2025     PT/PTA:     Number of PTA visits since last PT visit:   Time In: 0700   Time Out: 0745  Total Time: 45   Total Billable Time: 45    FOTO:  Intake Score:  %  Survey Score 1:  %  Survey Score 2:  %         Subjective             Objective            Treatment:  Therapeutic Exercise  TE 1: dowel flexion with 3# dowel 3x10  TE 2: side lying ER, abduction, flexion 2x10 each -  Manual Therapy  MT 1: post GH glide in neutral and ER  MT 2: MWM into inferior glide in scaption with R should  MT 3: STM to deltoid, post RTC muscles and upper traps  Balance/Neuromuscular Re-Education  NMR 1: bilateral ER RTB 3x10  NMR 2: horizontal abduction RTB 3x10  NMR 3: supine serratus punches with 3# dowel 3x10  NMR 4: rows GTB 3x10  NMR 5: shoulder extension GTB 3x10    Time Entry(in minutes):  Manual Therapy Time Entry: 15  Neuromuscular Re-Education Time Entry: 20  Therapeutic Exercise Time Entry: 10    Assessment & Plan   Assessment: Pt responded well to session today focused on scap engagement and post RTC engagement after joint mobilizations. Progressing well overall, update HEP next visit  Evaluation/Treatment Tolerance: Patient tolerated treatment well    Patient will continue to benefit from skilled outpatient physical therapy to address the deficits listed in the problem list box on initial evaluation, provide pt/family education and to maximize pt's level of independence in the home and community  environment.     Patient's spiritual, cultural, and educational needs considered and patient agreeable to plan of care and goals.           Plan: continue to progress per PT POC    Goals:   Active       LTG       50% FOTO score improvement (Progressing)       Start:  03/21/25    Expected End:  05/16/25            Pt will be able to perform > 60 min yardwork without pain (Progressing)       Start:  03/21/25    Expected End:  05/16/25            Pt will be able to don/doff shirts and jackets without pain (Progressing)       Start:  03/21/25    Expected End:  05/16/25               STG       10 degree R shoulder ROM improvement (Progressing)       Start:  03/21/25    Expected End:  04/18/25            1/2 grade MMT improvement (Progressing)       Start:  03/21/25    Expected End:  04/18/25            50% reduction in tenderness over the shoulder musculature (Progressing)       Start:  03/21/25    Expected End:  04/18/25            50% improvement in glenohumeral mobility in the shoulders (Progressing)       Start:  03/21/25    Expected End:  04/18/25                Mitzi Kemp, PT, DPT

## 2025-04-07 NOTE — PROGRESS NOTES
Outpatient Rehab    Physical Therapy Visit    Patient Name: Igor Rodriguez  MRN: [id number]  YOB: 1975  Encounter Date: 4/8/2025    Therapy Diagnosis:   Encounter Diagnosis   Name Primary?    Chronic pain of both shoulders Yes     Physician: Mario Vázquez MD    Physician Orders: Eval and Treat  Medical Diagnosis: Chronic pain of both shoulders    Visit # / Visits Authorized:  3 / 10  Insurance Authorization Period: 2/26/2025 to 12/31/2025  Date of Evaluation: 3/21/2025  Plan of Care Certification: 3/21/2025 to 6/21/2025     PT/PTA: PTA   Number of PTA visits since last PT visit:1  Time In: 0700   Time Out: 0755  Total Time: 55   Total Billable Time: 55    FOTO:  Intake Score:  %  Survey Score 1:  %  Survey Score 2:  %         Subjective   his left arm feels better but there hasn't been any change in the right.  Pain reported as 2/10. bilateral shoulder R>L    Objective            Treatment:  Therapeutic Exercise  TE 1: dowel flexion with 4# dowel 3x10  TE 2: side lying ER, abduction, flexion 2x10 each - 1#  TE 3: UBE 3'fwd/3'bck  Manual Therapy  MT 1: post GH glide in neutral and ER  MT 2: MWM into inferior glide in scaption with R should  MT 3: STM to deltoid, post RTC muscles and upper traps  Balance/Neuromuscular Re-Education  NMR 1: bilateral ER RTB 3x10  NMR 2: horizontal abduction RTB 3x10  NMR 3: supine serratus punches with 4# dowel 3x10  NMR 4: rows GTB 3x10  NMR 5: shoulder extension GTB 3x10  NMR 6: +serratus wall slides YTB 2x10    Time Entry(in minutes):  Manual Therapy Time Entry: 10  Neuromuscular Re-Education Time Entry: 25  Therapeutic Exercise Time Entry: 20    Assessment & Plan   Assessment:         Patient will continue to benefit from skilled outpatient physical therapy to address the deficits listed in the problem list box on initial evaluation, provide pt/family education and to maximize pt's level of independence in the home and community environment.     Patient's spiritual, cultural,  and educational needs considered and patient agreeable to plan of care and goals.           Plan: continue to progress per PT POC    Goals:   Active       LTG       50% FOTO score improvement (Progressing)       Start:  03/21/25    Expected End:  05/16/25            Pt will be able to perform > 60 min yardwork without pain (Progressing)       Start:  03/21/25    Expected End:  05/16/25            Pt will be able to don/doff shirts and jackets without pain (Progressing)       Start:  03/21/25    Expected End:  05/16/25               STG       10 degree R shoulder ROM improvement (Progressing)       Start:  03/21/25    Expected End:  04/18/25            1/2 grade MMT improvement (Progressing)       Start:  03/21/25    Expected End:  04/18/25            50% reduction in tenderness over the shoulder musculature (Progressing)       Start:  03/21/25    Expected End:  04/18/25            50% improvement in glenohumeral mobility in the shoulders (Progressing)       Start:  03/21/25    Expected End:  04/18/25                Janis Watters PTA

## 2025-04-08 ENCOUNTER — CLINICAL SUPPORT (OUTPATIENT)
Dept: REHABILITATION | Facility: HOSPITAL | Age: 50
End: 2025-04-08
Payer: COMMERCIAL

## 2025-04-08 DIAGNOSIS — G89.29 CHRONIC PAIN OF BOTH SHOULDERS: Primary | ICD-10-CM

## 2025-04-08 DIAGNOSIS — M25.512 CHRONIC PAIN OF BOTH SHOULDERS: Primary | ICD-10-CM

## 2025-04-08 DIAGNOSIS — M25.511 CHRONIC PAIN OF BOTH SHOULDERS: Primary | ICD-10-CM

## 2025-04-08 PROCEDURE — 97112 NEUROMUSCULAR REEDUCATION: CPT | Mod: PO,CQ

## 2025-04-08 PROCEDURE — 97110 THERAPEUTIC EXERCISES: CPT | Mod: PO,CQ

## 2025-04-08 PROCEDURE — 97140 MANUAL THERAPY 1/> REGIONS: CPT | Mod: PO,CQ

## 2025-04-14 NOTE — PROGRESS NOTES
Outpatient Rehab    Physical Therapy Visit    Patient Name: Igor Rodriguez  MRN: [id number]  YOB: 1975  Encounter Date: 4/15/2025    Therapy Diagnosis:   Encounter Diagnosis   Name Primary?    Chronic pain of both shoulders Yes     Physician: Mario Vázquez MD    Physician Orders: Eval and Treat  Medical Diagnosis: Chronic pain of both shoulders    Visit # / Visits Authorized:  4 / 10  Insurance Authorization Period: 2/26/2025 to 12/31/2025  Date of Evaluation: 3/21/2025  Plan of Care Certification: 3/21/2025 to 6/21/2025     PT/PTA: PTA   Number of PTA visits since last PT visit:2  Time In: 0710   Time Out: 0755  Total Time: 45   Total Billable Time: 45    FOTO:  Intake Score:  %  Survey Score 1:  %  Survey Score 2:  %         Subjective   he's been doing his exercises twice a day and it feels like it's getting better.  Pain reported as 0/10. bilateral shoulder R>L, no pain just soreness    Objective            Treatment:  Therapeutic Exercise  TE 1: dowel flexion with 4# dowel 3x10  TE 2: side lying ER, abduction, flexion 2x10 each - +2#  TE 3: UBE 3'fwd/3'bck  Manual Therapy  MT 1: post GH glide in neutral and ER  MT 2: MWM into inferior glide in scaption with R should  MT 3: STM to deltoid, post RTC muscles and upper traps  Balance/Neuromuscular Re-Education  NMR 1: bilateral ER RTB 3x10  NMR 2: horizontal abduction RTB 3x10  NMR 3: supine serratus punches with 4# dowel 3x10  NMR 4: rows GTB 3x10  NMR 5: shoulder extension GTB 3x10  NMR 6: serratus wall slides YTB 2x10  NMR 7: +scap pushups at wall x20  NMR 8: +shoulder taps at wall x20    Time Entry(in minutes):  Manual Therapy Time Entry: 8  Neuromuscular Re-Education Time Entry: 23  Therapeutic Exercise Time Entry: 14    Assessment & Plan   Assessment: Igor is progressing well and denied having pain today. He reports having soreness only and notices progress with ROM. He tolerated increased resistance on side lying exercises today as well as additionl  scapular stability exercises at wall today. Will continue to progress as tolerated.  Evaluation/Treatment Tolerance: Patient tolerated treatment well    Patient will continue to benefit from skilled outpatient physical therapy to address the deficits listed in the problem list box on initial evaluation, provide pt/family education and to maximize pt's level of independence in the home and community environment.     Patient's spiritual, cultural, and educational needs considered and patient agreeable to plan of care and goals.           Plan: continue to progress per PT POC    Goals:   Active       LTG       50% FOTO score improvement (Progressing)       Start:  03/21/25    Expected End:  05/16/25            Pt will be able to perform > 60 min yardwork without pain (Progressing)       Start:  03/21/25    Expected End:  05/16/25            Pt will be able to don/doff shirts and jackets without pain (Progressing)       Start:  03/21/25    Expected End:  05/16/25               STG       10 degree R shoulder ROM improvement (Progressing)       Start:  03/21/25    Expected End:  04/18/25            1/2 grade MMT improvement (Progressing)       Start:  03/21/25    Expected End:  04/18/25            50% reduction in tenderness over the shoulder musculature (Progressing)       Start:  03/21/25    Expected End:  04/18/25            50% improvement in glenohumeral mobility in the shoulders (Progressing)       Start:  03/21/25    Expected End:  04/18/25                Janis Watters PTA

## 2025-04-15 ENCOUNTER — CLINICAL SUPPORT (OUTPATIENT)
Dept: REHABILITATION | Facility: HOSPITAL | Age: 50
End: 2025-04-15
Payer: COMMERCIAL

## 2025-04-15 DIAGNOSIS — G89.29 CHRONIC PAIN OF BOTH SHOULDERS: Primary | ICD-10-CM

## 2025-04-15 DIAGNOSIS — M25.512 CHRONIC PAIN OF BOTH SHOULDERS: Primary | ICD-10-CM

## 2025-04-15 DIAGNOSIS — M25.511 CHRONIC PAIN OF BOTH SHOULDERS: Primary | ICD-10-CM

## 2025-04-15 PROCEDURE — 97110 THERAPEUTIC EXERCISES: CPT | Mod: PO,CQ

## 2025-04-15 PROCEDURE — 97112 NEUROMUSCULAR REEDUCATION: CPT | Mod: PO,CQ

## 2025-04-15 PROCEDURE — 97140 MANUAL THERAPY 1/> REGIONS: CPT | Mod: PO,CQ

## 2025-04-22 ENCOUNTER — CLINICAL SUPPORT (OUTPATIENT)
Dept: REHABILITATION | Facility: HOSPITAL | Age: 50
End: 2025-04-22
Payer: COMMERCIAL

## 2025-04-22 DIAGNOSIS — G89.29 CHRONIC PAIN OF BOTH SHOULDERS: Primary | ICD-10-CM

## 2025-04-22 DIAGNOSIS — M25.511 CHRONIC PAIN OF BOTH SHOULDERS: Primary | ICD-10-CM

## 2025-04-22 DIAGNOSIS — M25.512 CHRONIC PAIN OF BOTH SHOULDERS: Primary | ICD-10-CM

## 2025-04-22 PROCEDURE — 97112 NEUROMUSCULAR REEDUCATION: CPT | Mod: PO | Performed by: PHYSICAL THERAPIST

## 2025-04-22 PROCEDURE — 97110 THERAPEUTIC EXERCISES: CPT | Mod: PO | Performed by: PHYSICAL THERAPIST

## 2025-04-22 NOTE — PROGRESS NOTES
Outpatient Rehab    Physical Therapy Progress Note    Patient Name: Igor Rodriguez  MRN: [id number]  YOB: 1975  Encounter Date: 4/22/2025    Therapy Diagnosis:   Encounter Diagnosis   Name Primary?    Chronic pain of both shoulders Yes     Physician: Mario Vázquez MD    Physician Orders: Eval and Treat  Medical Diagnosis: Chronic pain of both shoulders    Visit # / Visits Authorized:  5 / 10  Insurance Authorization Period: 2/26/2025 to 12/31/2025  Date of Evaluation: 2/26/2025  Plan of Care Certification: 2/26/2025 to 5/26/2025     PT/PTA:     Number of PTA visits since last PT visit:   Time In: 0705   Time Out: 0750  Total Time: 45   Total Billable Time: 45    FOTO:  Intake Score:  %  Survey Score 1:  %  Survey Score 2:  %         Subjective   doing well overall, he used the arm a litle too much this weekend so is feeling more soreness.  Pain reported as 1/10.      Objective      Shoulder Range of Motion  Right Shoulder   Active (deg) Passive (deg) Pain   Flexion 162       Extension         Scaption         ABduction 140       ADduction         Horizontal ABduction         Horizontal ADduction         External Rotation (Shoulder ABducted 0 degrees)         External Rotation (Shoulder ABducted 45 degrees)         External Rotation (Shoulder ABducted 90 degrees)         Internal Rotation (Shoulder ABducted 0 degrees)         Internal Rotation (Shoulder ABducted 45 degrees)         Internal Rotation (Shoulder ABducted 90 degrees)           Left Shoulder   Active (deg) Passive (deg) Pain   Flexion 160       Extension         Scaption         ABduction 160       ADduction         Horizontal ABduction         Horizontal ADduction         External Rotation (Shoulder ABducted 0 degrees)         External Rotation (Shoulder ABducted 45 degrees)         External Rotation (Shoulder ABducted 90 degrees)         Internal Rotation (Shoulder ABducted 0 degrees)         Internal Rotation (Shoulder ABducted 45 degrees)          Internal Rotation (Shoulder ABducted 90 degrees)             Shoulder, Elbow, or Forearm Range of Motion Details: Right internal rotation to L1, external rotation to T4           Treatment:  Therapeutic Exercise  TE 1: dowel flexion with 4# dowel 3x10  TE 2: side lying ER, abduction, flexion 2x10 each - +2#  TE 3: UBE 3'fwd/3'bck  TE 4: functional IR 2 X 60 seconds pulleys  Manual Therapy  MT 1: post GH glide in neutral and ER  MT 2: MWM into inferior glide in scaption with R should  MT 3: STM to deltoid, post RTC muscles and upper traps  Balance/Neuromuscular Re-Education  NMR 1: bilateral ER RTB 3x10  NMR 2: horizontal abduction RTB 3x10  NMR 3: supine serratus punches with 4# dowel 3x10  NMR 4: rows GTB 3x10  NMR 5: shoulder extension GTB 3x10  NMR 6: serratus wall slides YTB 2x10  NMR 7: +scap pushups at wall x20  NMR 8: +shoulder taps at wall x20    Time Entry(in minutes):  Manual Therapy Time Entry: 10  Neuromuscular Re-Education Time Entry: 25  Therapeutic Exercise Time Entry: 10    Assessment & Plan   Assessment: Igor is showing good overall progress and has improved ROM and strength in the shoulder, he continues to have challenges with functional IR activity but is progressing well into shoulder ROM       Patient will continue to benefit from skilled outpatient physical therapy to address the deficits listed in the problem list box on initial evaluation, provide pt/family education and to maximize pt's level of independence in the home and community environment.     Patient's spiritual, cultural, and educational needs considered and patient agreeable to plan of care and goals.           Plan: continue to progress per PT POC    Goals:   Active       LTG       50% FOTO score improvement (Progressing)       Start:  03/21/25    Expected End:  05/16/25            Pt will be able to perform > 60 min yardwork without pain (Progressing)       Start:  03/21/25    Expected End:  05/16/25            Pt will be able  to don/doff shirts and jackets without pain (Progressing)       Start:  03/21/25    Expected End:  05/16/25               STG       10 degree R shoulder ROM improvement (Progressing)       Start:  03/21/25    Expected End:  04/18/25            1/2 grade MMT improvement (Progressing)       Start:  03/21/25    Expected End:  04/18/25            50% reduction in tenderness over the shoulder musculature (Progressing)       Start:  03/21/25    Expected End:  04/18/25            50% improvement in glenohumeral mobility in the shoulders (Progressing)       Start:  03/21/25    Expected End:  04/18/25                Mitzi Kemp PT, DPT

## 2025-04-29 ENCOUNTER — CLINICAL SUPPORT (OUTPATIENT)
Dept: REHABILITATION | Facility: HOSPITAL | Age: 50
End: 2025-04-29
Payer: COMMERCIAL

## 2025-04-29 DIAGNOSIS — M25.512 CHRONIC PAIN OF BOTH SHOULDERS: Primary | ICD-10-CM

## 2025-04-29 DIAGNOSIS — M25.511 CHRONIC PAIN OF BOTH SHOULDERS: Primary | ICD-10-CM

## 2025-04-29 DIAGNOSIS — G89.29 CHRONIC PAIN OF BOTH SHOULDERS: Primary | ICD-10-CM

## 2025-04-29 PROCEDURE — 97112 NEUROMUSCULAR REEDUCATION: CPT | Mod: PO | Performed by: PHYSICAL THERAPIST

## 2025-04-29 PROCEDURE — 97110 THERAPEUTIC EXERCISES: CPT | Mod: PO | Performed by: PHYSICAL THERAPIST

## 2025-04-29 NOTE — PROGRESS NOTES
Outpatient Rehab    Physical Therapy Visit    Patient Name: Igor Rodriguez  MRN: [id number]  YOB: 1975  Encounter Date: 4/29/2025    Therapy Diagnosis:   Encounter Diagnosis   Name Primary?    Chronic pain of both shoulders Yes     Physician: Mario Vázquez MD    Physician Orders: Eval and Treat  Medical Diagnosis: Chronic pain of both shoulders    Visit # / Visits Authorized:  6 / 10  Insurance Authorization Period: 2/26/2025 to 12/31/2025  Date of Evaluation: 2/26/2025  Plan of Care Certification: 2/26/2025 to 5/26/2025     PT/PTA:     Number of PTA visits since last PT visit:   Time In: 0705   Time Out: 0750  Total Time: 45   Total Billable Time: 45    FOTO:  Intake Score:  %  Survey Score 1:  %  Survey Score 2:  %         Subjective   not sure what he did but the shoulder was hurting a little more this weekend, lying on the shoulder is not as comfortable.  Pain reported as 3/10.      Objective            Treatment:  Therapeutic Exercise  TE 1: dowel flexion with 4# dowel 3x10  TE 2: side lying ER, abduction, flexion 2x10 each - +2#  TE 3: UBE 3'fwd/3'bck  TE 4: functional IR 2 X 60 seconds pulleys  TE 5: pec stretching R 2 X 30 seconds  Manual Therapy  MT 1: post GH glide in neutral and ER  MT 2: MWM into inferior glide in scaption with R should  MT 3: STM to deltoid, post RTC muscles and upper traps  MT 4: MFR to pectoral muscles  Balance/Neuromuscular Re-Education  NMR 1: bilateral ER RTB 3x10  NMR 2: horizontal abduction RTB 3x10  NMR 3: supine serratus punches with 4# dowel 3x10  NMR 4: rows GTB 3x10  NMR 5: shoulder extension GTB 3x10  NMR 6: serratus wall slides YTB 2x10  NMR 7: +scap pushups at wall x20  NMR 8: +shoulder taps at wall x20    Time Entry(in minutes):  Manual Therapy Time Entry: 10  Neuromuscular Re-Education Time Entry: 25  Therapeutic Exercise Time Entry: 10    Assessment & Plan   Assessment: Igor did well today after pec MFR and stretching. He was able to get more comfortable into IR  position without anterior shoulder translation. Progressing well with strengthening, but is presenting with incresaed tightness over the pectoral complex  Evaluation/Treatment Tolerance: Patient tolerated treatment well    Patient will continue to benefit from skilled outpatient physical therapy to address the deficits listed in the problem list box on initial evaluation, provide pt/family education and to maximize pt's level of independence in the home and community environment.     Patient's spiritual, cultural, and educational needs considered and patient agreeable to plan of care and goals.           Plan: continue to progress per PT POC    Goals:   Active       LTG       50% FOTO score improvement (Progressing)       Start:  03/21/25    Expected End:  05/16/25            Pt will be able to perform > 60 min yardwork without pain (Progressing)       Start:  03/21/25    Expected End:  05/16/25            Pt will be able to don/doff shirts and jackets without pain (Progressing)       Start:  03/21/25    Expected End:  05/16/25               STG       10 degree R shoulder ROM improvement (Progressing)       Start:  03/21/25    Expected End:  04/18/25            1/2 grade MMT improvement (Progressing)       Start:  03/21/25    Expected End:  04/18/25            50% reduction in tenderness over the shoulder musculature (Progressing)       Start:  03/21/25    Expected End:  04/18/25            50% improvement in glenohumeral mobility in the shoulders (Progressing)       Start:  03/21/25    Expected End:  04/18/25                Mitzi Kemp, PT, DPT

## 2025-05-07 ENCOUNTER — CLINICAL SUPPORT (OUTPATIENT)
Dept: REHABILITATION | Facility: HOSPITAL | Age: 50
End: 2025-05-07
Payer: COMMERCIAL

## 2025-05-07 DIAGNOSIS — M25.512 CHRONIC PAIN OF BOTH SHOULDERS: Primary | ICD-10-CM

## 2025-05-07 DIAGNOSIS — M25.511 CHRONIC PAIN OF BOTH SHOULDERS: Primary | ICD-10-CM

## 2025-05-07 DIAGNOSIS — G89.29 CHRONIC PAIN OF BOTH SHOULDERS: Primary | ICD-10-CM

## 2025-05-07 PROCEDURE — 97112 NEUROMUSCULAR REEDUCATION: CPT | Mod: PO

## 2025-05-07 PROCEDURE — 97140 MANUAL THERAPY 1/> REGIONS: CPT | Mod: PO

## 2025-05-07 NOTE — PROGRESS NOTES
Outpatient Rehab    Physical Therapy Visit    Patient Name: Igor Rodriguez  MRN: [id number]  YOB: 1975  Encounter Date: 5/7/2025    Therapy Diagnosis:   Encounter Diagnosis   Name Primary?    Chronic pain of both shoulders Yes     Physician: Mario Vázquez MD    Physician Orders: Eval and Treat  Medical Diagnosis: Chronic pain of both shoulders    Visit # / Visits Authorized:  7 / 10  Insurance Authorization Period: 2/26/2025 to 12/31/2025  Date of Evaluation: 2/26/2025   Plan of Care Certification: 2/26/2025 to 5/26/2025       PT/PTA: PT   Number of PTA visits since last PT visit:0  Time In: 0703   Time Out: 0800  Total Time (in minutes): 57   Total Billable Time (in minutes): 38    FOTO:  Intake Score:  %  Survey Score 2:  %  Survey Score 3:  %         Subjective   Pt reported that his shoulders are feeling good this morning and have been for the last few days..  Pain reported as 1/10.      Objective            Treatment:  Therapeutic Exercise  TE 1: dowel flexion with 4# dowel 3x10  TE 2: side lying ER, abduction, flexion 2x10 each - +2#  TE 3: UBE 3'fwd/3'bck  TE 4: functional IR 2 X 60 seconds pulleys  TE 5: pec stretching R 2 X 30 seconds  Manual Therapy  MT 1: post GH glide in neutral and ER  MT 4: MFR to pectoral muscles  Balance/Neuromuscular Re-Education  NMR 1: bilateral ER RTB 3x10  NMR 2: horizontal abduction RTB 3x10  NMR 3: supine serratus punches with 4# dowel 3x10  NMR 4: rows GTB 3x10  NMR 5: shoulder extension GTB 3x10  NMR 6: serratus wall slides YTB 2x10 - with foam roller  NMR 7: +scap pushups at wall x20  NMR 8: +shoulder taps at wall x20      Time Entry(in minutes):  Therapeutic Exercise Time Entry: 20    Assessment & Plan   Assessment: Malinda reported that his shoulders have been feeling better the last few days. He reported some discomfort with serratus wall slides with a foam roller, but had little difficulty with the other exercises.  Evaluation/Treatment Tolerance: Patient tolerated  treatment well    Patient will continue to benefit from skilled outpatient physical therapy to address the deficits listed in the problem list box on initial evaluation, provide pt/family education and to maximize pt's level of independence in the home and community environment.     Patient's spiritual, cultural, and educational needs considered and patient agreeable to plan of care and goals.           Plan: continue to progress per PT POC    Goals:   Active       LTG       50% FOTO score improvement (Progressing)       Start:  03/21/25    Expected End:  05/16/25            Pt will be able to perform > 60 min yardwork without pain (Progressing)       Start:  03/21/25    Expected End:  05/16/25            Pt will be able to don/doff shirts and jackets without pain (Progressing)       Start:  03/21/25    Expected End:  05/16/25               STG       10 degree R shoulder ROM improvement (Progressing)       Start:  03/21/25    Expected End:  04/18/25            1/2 grade MMT improvement (Progressing)       Start:  03/21/25    Expected End:  04/18/25            50% reduction in tenderness over the shoulder musculature (Progressing)       Start:  03/21/25    Expected End:  04/18/25            50% improvement in glenohumeral mobility in the shoulders (Progressing)       Start:  03/21/25    Expected End:  04/18/25                Leroy Rizzo, PT

## 2025-05-21 NOTE — PROGRESS NOTES
Outpatient Rehab    Physical Therapy Visit    Patient Name: Igor Rodriguez  MRN: [id number]  YOB: 1975  Encounter Date: 5/22/2025    Therapy Diagnosis:   Encounter Diagnosis   Name Primary?    Chronic pain of both shoulders Yes     Physician: Mario Vázquez MD    Physician Orders: Eval and Treat  Medical Diagnosis: Chronic pain of both shoulders    Visit # / Visits Authorized:  8 / 10  Insurance Authorization Period: 2/26/2025 to 12/31/2025  Date of Evaluation: 3/19/2025  Plan of Care Certification: 3/21/2025 to 6/21/2025      PT/PTA: PTA   Number of PTA visits since last PT visit:1  Time In: 0701   Time Out: 0747  Total Time (in minutes): 46   Total Billable Time (in minutes): 46    FOTO:  Intake Score:  %  Survey Score 2:  %  Survey Score 3:  %    Precautions:       Subjective   patient reports he's been feeling pretty good and was able to swim without issues.  Pain reported as 0/10. bilateral shoulder R>L, no pain just soreness    Objective            Treatment:  Therapeutic Exercise  TE 1: dowel flexion with 4# dowel 3x10  TE 2: side lying ER, abduction, flexion 2x10 each - +2#  TE 3: UBE 3'fwd/3'bck  TE 4: functional IR 2 X 60 seconds pulleys  TE 5: pec stretching R 2 X 30 seconds  Balance/Neuromuscular Re-Education  NMR 1: bilateral ER GTB 3x10  NMR 2: horizontal abduction GTB 3x10  NMR 3: supine serratus punches with 4# dowel 3x10  NMR 4: rows GTB 3x10  NMR 5: shoulder extension GTB 3x10  NMR 6: serratus wall slides YTB 2x10 - with foam roller  NMR 7: scap pushups at wall x20  NMR 8: shoulder taps at wall x20  Therapeutic Activity  TA 1: +waiters carry 3# x1 lap, 5# x1.5 laps  TA 2: +statue of [place] carry 5# 2 laps  TA 3: +landmine press 10# 3x10    Time Entry(in minutes):  Neuromuscular Re-Education Time Entry: 21  Therapeutic Activity Time Entry: 10  Therapeutic Exercise Time Entry: 15    Assessment & Plan   Assessment: Igor reports overall improvement in shoulder symptoms with only occasional  discomfort with certain movements, especially overhead. He tolerated exercises as noted without an increase in pain including addition of functional carries and landmine press. Will continue to progress as tolerated.  Evaluation/Treatment Tolerance: Patient tolerated treatment well    Patient will continue to benefit from skilled outpatient physical therapy to address the deficits listed in the problem list box on initial evaluation, provide pt/family education and to maximize pt's level of independence in the home and community environment.     Patient's spiritual, cultural, and educational needs considered and patient agreeable to plan of care and goals.           Plan: continue to progress per PT POC    Goals:   Active       LTG       50% FOTO score improvement (Ongoing)       Start:  03/21/25    Expected End:  05/16/25            Pt will be able to perform > 60 min yardwork without pain (Ongoing)       Start:  03/21/25    Expected End:  05/16/25            Pt will be able to don/doff shirts and jackets without pain (Ongoing)       Start:  03/21/25    Expected End:  05/16/25               STG       10 degree R shoulder ROM improvement (Ongoing)       Start:  03/21/25    Expected End:  04/18/25            1/2 grade MMT improvement (Ongoing)       Start:  03/21/25    Expected End:  04/18/25            50% reduction in tenderness over the shoulder musculature (Ongoing)       Start:  03/21/25    Expected End:  04/18/25            50% improvement in glenohumeral mobility in the shoulders (Ongoing)       Start:  03/21/25    Expected End:  04/18/25                Janis Watters PTA

## 2025-05-22 ENCOUNTER — CLINICAL SUPPORT (OUTPATIENT)
Dept: REHABILITATION | Facility: HOSPITAL | Age: 50
End: 2025-05-22
Payer: COMMERCIAL

## 2025-05-22 DIAGNOSIS — M25.512 CHRONIC PAIN OF BOTH SHOULDERS: Primary | ICD-10-CM

## 2025-05-22 DIAGNOSIS — G89.29 CHRONIC PAIN OF BOTH SHOULDERS: Primary | ICD-10-CM

## 2025-05-22 DIAGNOSIS — M25.511 CHRONIC PAIN OF BOTH SHOULDERS: Primary | ICD-10-CM

## 2025-05-22 PROCEDURE — 97112 NEUROMUSCULAR REEDUCATION: CPT | Mod: PO,CQ

## 2025-05-22 PROCEDURE — 97110 THERAPEUTIC EXERCISES: CPT | Mod: PO,CQ

## 2025-05-22 PROCEDURE — 97530 THERAPEUTIC ACTIVITIES: CPT | Mod: PO,CQ

## 2025-05-28 ENCOUNTER — CLINICAL SUPPORT (OUTPATIENT)
Dept: REHABILITATION | Facility: HOSPITAL | Age: 50
End: 2025-05-28
Payer: COMMERCIAL

## 2025-05-28 DIAGNOSIS — G89.29 CHRONIC PAIN OF BOTH SHOULDERS: Primary | ICD-10-CM

## 2025-05-28 DIAGNOSIS — M25.512 CHRONIC PAIN OF BOTH SHOULDERS: Primary | ICD-10-CM

## 2025-05-28 DIAGNOSIS — M25.511 CHRONIC PAIN OF BOTH SHOULDERS: Primary | ICD-10-CM

## 2025-05-28 PROCEDURE — 97112 NEUROMUSCULAR REEDUCATION: CPT | Mod: PO

## 2025-05-28 PROCEDURE — 97530 THERAPEUTIC ACTIVITIES: CPT | Mod: PO

## 2025-05-28 PROCEDURE — 97110 THERAPEUTIC EXERCISES: CPT | Mod: PO
